# Patient Record
Sex: FEMALE | NOT HISPANIC OR LATINO | Employment: FULL TIME | URBAN - METROPOLITAN AREA
[De-identification: names, ages, dates, MRNs, and addresses within clinical notes are randomized per-mention and may not be internally consistent; named-entity substitution may affect disease eponyms.]

---

## 2024-08-07 PROBLEM — N83.209 OVARIAN CYST: Status: RESOLVED | Noted: 2021-08-30 | Resolved: 2024-08-07

## 2024-08-07 PROBLEM — Z15.89 MONOALLELIC MUTATION OF CHEK2 GENE IN FEMALE PATIENT: Status: ACTIVE | Noted: 2024-08-07

## 2024-08-07 PROBLEM — U07.1 COVID-19 VIRUS INFECTION: Status: RESOLVED | Noted: 2020-11-24 | Resolved: 2024-08-07

## 2024-08-07 PROBLEM — M71.20 BAKER'S CYST OF KNEE: Status: ACTIVE | Noted: 2020-08-27

## 2024-08-07 PROBLEM — N94.5 SECONDARY DYSMENORRHEA: Status: RESOLVED | Noted: 2018-07-03 | Resolved: 2024-08-07

## 2024-08-07 PROBLEM — R07.89 ATYPICAL CHEST PAIN: Status: RESOLVED | Noted: 2018-07-03 | Resolved: 2024-08-07

## 2024-08-07 PROBLEM — Z15.01 MONOALLELIC MUTATION OF CHEK2 GENE IN FEMALE PATIENT: Status: ACTIVE | Noted: 2024-08-07

## 2024-08-07 PROBLEM — R51.9 FREQUENT HEADACHES: Status: RESOLVED | Noted: 2018-07-03 | Resolved: 2024-08-07

## 2024-08-07 PROBLEM — Z15.09 MONOALLELIC MUTATION OF CHEK2 GENE IN FEMALE PATIENT: Status: ACTIVE | Noted: 2024-08-07

## 2024-08-07 PROBLEM — Z15.02 MONOALLELIC MUTATION OF CHEK2 GENE IN FEMALE PATIENT: Status: ACTIVE | Noted: 2024-08-07

## 2024-08-07 PROBLEM — D25.1 INTRAMURAL LEIOMYOMA OF UTERUS: Status: RESOLVED | Noted: 2018-08-10 | Resolved: 2024-08-07

## 2024-08-07 PROBLEM — E66.01 MORBID OBESITY (MULTI): Status: ACTIVE | Noted: 2018-07-03

## 2024-08-07 PROBLEM — G43.909 MIGRAINE: Status: ACTIVE | Noted: 2022-09-09

## 2024-08-07 PROBLEM — N93.8 DYSFUNCTIONAL UTERINE BLEEDING: Status: RESOLVED | Noted: 2020-07-17 | Resolved: 2024-08-07

## 2024-08-07 PROBLEM — M25.562 LEFT KNEE PAIN: Status: RESOLVED | Noted: 2020-08-27 | Resolved: 2024-08-07

## 2024-08-07 PROBLEM — K43.2 INCISIONAL HERNIA: Status: RESOLVED | Noted: 2021-08-30 | Resolved: 2024-08-07

## 2024-08-08 ENCOUNTER — APPOINTMENT (OUTPATIENT)
Dept: PRIMARY CARE | Facility: CLINIC | Age: 49
End: 2024-08-08
Payer: COMMERCIAL

## 2024-08-08 VITALS
SYSTOLIC BLOOD PRESSURE: 116 MMHG | DIASTOLIC BLOOD PRESSURE: 78 MMHG | OXYGEN SATURATION: 98 % | RESPIRATION RATE: 16 BRPM | BODY MASS INDEX: 35.24 KG/M2 | HEART RATE: 79 BPM | WEIGHT: 225 LBS

## 2024-08-08 DIAGNOSIS — N94.6 DYSMENORRHEA: ICD-10-CM

## 2024-08-08 DIAGNOSIS — Z12.31 ENCOUNTER FOR SCREENING MAMMOGRAM FOR MALIGNANT NEOPLASM OF BREAST: ICD-10-CM

## 2024-08-08 DIAGNOSIS — G43.009 MIGRAINE WITHOUT AURA AND WITHOUT STATUS MIGRAINOSUS, NOT INTRACTABLE: ICD-10-CM

## 2024-08-08 DIAGNOSIS — R10.32 LEFT LOWER QUADRANT PAIN: Primary | ICD-10-CM

## 2024-08-08 PROCEDURE — 1036F TOBACCO NON-USER: CPT | Performed by: PHYSICIAN ASSISTANT

## 2024-08-08 PROCEDURE — 99214 OFFICE O/P EST MOD 30 MIN: CPT | Performed by: PHYSICIAN ASSISTANT

## 2024-08-08 RX ORDER — RIZATRIPTAN BENZOATE 10 MG/1
10 TABLET, ORALLY DISINTEGRATING ORAL ONCE AS NEEDED
Qty: 9 TABLET | Refills: 0 | Status: SHIPPED | OUTPATIENT
Start: 2024-08-08 | End: 2024-09-07

## 2024-08-08 ASSESSMENT — ENCOUNTER SYMPTOMS
BLOOD IN STOOL: 0
CONSTIPATION: 0
DIARRHEA: 0
LOSS OF SENSATION IN FEET: 0
NAUSEA: 0
DEPRESSION: 0
VOMITING: 0
OCCASIONAL FEELINGS OF UNSTEADINESS: 0
RECTAL PAIN: 0
ABDOMINAL PAIN: 1

## 2024-08-08 ASSESSMENT — PAIN SCALES - GENERAL: PAINLEVEL: 3

## 2024-08-08 ASSESSMENT — PATIENT HEALTH QUESTIONNAIRE - PHQ9
1. LITTLE INTEREST OR PLEASURE IN DOING THINGS: NOT AT ALL
2. FEELING DOWN, DEPRESSED OR HOPELESS: NOT AT ALL
SUM OF ALL RESPONSES TO PHQ9 QUESTIONS 1 AND 2: 0

## 2024-08-08 NOTE — PROGRESS NOTES
Subjective   Patient ID: Lizzeth Hall is a 49 y.o. female who presents for Mass (Patient is here for a lump on her lower abd near the groin area, patient was seen before for it and feels it is getting bigger).    Abdominal Pain  This is a chronic problem. The current episode started more than 1 year ago. Pertinent negatives include no constipation, diarrhea, nausea or vomiting.    Had left ovary swelling on her left US in 2018. Never had her follow up US.    Review of Systems   Gastrointestinal:  Positive for abdominal pain. Negative for blood in stool, constipation, diarrhea, nausea, rectal pain and vomiting.   Genitourinary:  Positive for pelvic pain. Negative for urgency, vaginal bleeding, vaginal discharge and vaginal pain.       Objective   /78 (BP Location: Left arm, Patient Position: Sitting, BP Cuff Size: Large adult)   Pulse 79   Resp 16   Wt 102 kg (225 lb)   LMP 07/29/2024   SpO2 98%   BMI 35.24 kg/m²     Physical Exam  Cardiovascular:      Rate and Rhythm: Normal rate and regular rhythm.   Pulmonary:      Effort: Pulmonary effort is normal.   Abdominal:      Tenderness: There is abdominal tenderness. There is no guarding or rebound.      Comments: May have a hernia in the left lower quadrant when standing.   Neurological:      Mental Status: She is alert.         Assessment/Plan   Diagnoses and all orders for this visit:  Left lower quadrant pain  -     CT abdomen pelvis w and wo IV contrast; Future  -     CBC and Auto Differential; Future  -     Comprehensive Metabolic Panel; Future  -     Urinalysis with Reflex Microscopic; Future  Dysmenorrhea  -     CT abdomen pelvis w and wo IV contrast; Future  -     CBC and Auto Differential; Future  -     Comprehensive Metabolic Panel; Future  -     Urinalysis with Reflex Microscopic; Future  Encounter for screening mammogram for malignant neoplasm of breast  -     BI mammo bilateral screening tomosynthesis; Future  Migraine without aura and without  status migrainosus, not intractable  -     rizatriptan MLT (Maxalt-MLT) 10 mg disintegrating tablet; Take 1 tablet (10 mg) by mouth 1 time if needed for migraine. May repeat in 2 hours if unresolved. Do not exceed 30 mg in 24 hours.  Will obtain a CT due to persistency of symptoms, and worsening pain.She states the pelvic ultrasound was very painful and declines having another ultrasound.  Follow-up in a few weeks after her CT.

## 2024-08-10 ENCOUNTER — LAB (OUTPATIENT)
Dept: LAB | Facility: LAB | Age: 49
End: 2024-08-10
Payer: COMMERCIAL

## 2024-08-10 DIAGNOSIS — R10.32 LEFT LOWER QUADRANT PAIN: ICD-10-CM

## 2024-08-10 DIAGNOSIS — N94.6 DYSMENORRHEA: ICD-10-CM

## 2024-08-10 LAB
ALBUMIN SERPL-MCNC: 4.1 G/DL (ref 3.5–5)
ALP BLD-CCNC: 35 U/L (ref 35–125)
ALT SERPL-CCNC: 10 U/L (ref 5–40)
ANION GAP SERPL CALC-SCNC: 10 MMOL/L
APPEARANCE UR: CLEAR
AST SERPL-CCNC: 13 U/L (ref 5–40)
BASOPHILS # BLD AUTO: 0.1 X10*3/UL (ref 0–0.1)
BASOPHILS NFR BLD AUTO: 1 %
BILIRUB SERPL-MCNC: 0.4 MG/DL (ref 0.1–1.2)
BILIRUB UR STRIP.AUTO-MCNC: NEGATIVE MG/DL
BUN SERPL-MCNC: 15 MG/DL (ref 8–25)
CALCIUM SERPL-MCNC: 9.3 MG/DL (ref 8.5–10.4)
CHLORIDE SERPL-SCNC: 104 MMOL/L (ref 97–107)
CO2 SERPL-SCNC: 26 MMOL/L (ref 24–31)
COLOR UR: NORMAL
CREAT SERPL-MCNC: 0.9 MG/DL (ref 0.4–1.6)
EGFRCR SERPLBLD CKD-EPI 2021: 78 ML/MIN/1.73M*2
EOSINOPHIL # BLD AUTO: 0 X10*3/UL (ref 0–0.7)
EOSINOPHIL NFR BLD AUTO: 0 %
ERYTHROCYTE [DISTWIDTH] IN BLOOD BY AUTOMATED COUNT: 13.7 % (ref 11.5–14.5)
GLUCOSE SERPL-MCNC: 86 MG/DL (ref 65–99)
GLUCOSE UR STRIP.AUTO-MCNC: NORMAL MG/DL
HCT VFR BLD AUTO: 41.9 % (ref 36–46)
HGB BLD-MCNC: 13.4 G/DL (ref 12–16)
IMM GRANULOCYTES # BLD AUTO: 0.02 X10*3/UL (ref 0–0.7)
IMM GRANULOCYTES NFR BLD AUTO: 0.2 % (ref 0–0.9)
KETONES UR STRIP.AUTO-MCNC: NEGATIVE MG/DL
LEUKOCYTE ESTERASE UR QL STRIP.AUTO: NEGATIVE
LYMPHOCYTES # BLD AUTO: 3.44 X10*3/UL (ref 1.2–4.8)
LYMPHOCYTES NFR BLD AUTO: 35.8 %
MCH RBC QN AUTO: 28.8 PG (ref 26–34)
MCHC RBC AUTO-ENTMCNC: 32 G/DL (ref 32–36)
MCV RBC AUTO: 90 FL (ref 80–100)
MONOCYTES # BLD AUTO: 0.9 X10*3/UL (ref 0.1–1)
MONOCYTES NFR BLD AUTO: 9.4 %
NEUTROPHILS # BLD AUTO: 5.16 X10*3/UL (ref 1.2–7.7)
NEUTROPHILS NFR BLD AUTO: 53.6 %
NITRITE UR QL STRIP.AUTO: NEGATIVE
NRBC BLD-RTO: 0 /100 WBCS (ref 0–0)
PH UR STRIP.AUTO: 7 [PH]
PLATELET # BLD AUTO: 207 X10*3/UL (ref 150–450)
POTASSIUM SERPL-SCNC: 5 MMOL/L (ref 3.4–5.1)
PROT SERPL-MCNC: 6 G/DL (ref 5.9–7.9)
PROT UR STRIP.AUTO-MCNC: NEGATIVE MG/DL
RBC # BLD AUTO: 4.65 X10*6/UL (ref 4–5.2)
RBC # UR STRIP.AUTO: NEGATIVE /UL
SODIUM SERPL-SCNC: 140 MMOL/L (ref 133–145)
SP GR UR STRIP.AUTO: 1.01
UROBILINOGEN UR STRIP.AUTO-MCNC: NORMAL MG/DL
WBC # BLD AUTO: 9.6 X10*3/UL (ref 4.4–11.3)

## 2024-08-10 PROCEDURE — 81003 URINALYSIS AUTO W/O SCOPE: CPT

## 2024-08-10 PROCEDURE — 80053 COMPREHEN METABOLIC PANEL: CPT

## 2024-08-10 PROCEDURE — 36415 COLL VENOUS BLD VENIPUNCTURE: CPT

## 2024-08-10 PROCEDURE — 85025 COMPLETE CBC W/AUTO DIFF WBC: CPT

## 2024-08-17 ENCOUNTER — HOSPITAL ENCOUNTER (OUTPATIENT)
Dept: RADIOLOGY | Facility: CLINIC | Age: 49
Discharge: HOME | End: 2024-08-17
Payer: COMMERCIAL

## 2024-08-17 VITALS — BODY MASS INDEX: 35.31 KG/M2 | WEIGHT: 225 LBS | HEIGHT: 67 IN

## 2024-08-17 DIAGNOSIS — Z12.31 ENCOUNTER FOR SCREENING MAMMOGRAM FOR MALIGNANT NEOPLASM OF BREAST: ICD-10-CM

## 2024-08-17 PROCEDURE — 77067 SCR MAMMO BI INCL CAD: CPT | Performed by: RADIOLOGY

## 2024-08-17 PROCEDURE — 77063 BREAST TOMOSYNTHESIS BI: CPT | Performed by: RADIOLOGY

## 2024-08-17 PROCEDURE — 77067 SCR MAMMO BI INCL CAD: CPT

## 2024-08-23 ENCOUNTER — HOSPITAL ENCOUNTER (OUTPATIENT)
Dept: RADIOLOGY | Facility: CLINIC | Age: 49
End: 2024-08-23
Payer: COMMERCIAL

## 2024-08-30 DIAGNOSIS — G43.009 MIGRAINE WITHOUT AURA AND WITHOUT STATUS MIGRAINOSUS, NOT INTRACTABLE: ICD-10-CM

## 2024-08-30 RX ORDER — RIZATRIPTAN BENZOATE 10 MG/1
10 TABLET, ORALLY DISINTEGRATING ORAL ONCE AS NEEDED
Qty: 9 TABLET | Refills: 0 | Status: SHIPPED | OUTPATIENT
Start: 2024-08-30 | End: 2024-09-29

## 2024-09-03 ENCOUNTER — HOSPITAL ENCOUNTER (OUTPATIENT)
Dept: RADIOLOGY | Facility: CLINIC | Age: 49
Discharge: HOME | End: 2024-09-03
Payer: COMMERCIAL

## 2024-09-03 DIAGNOSIS — R10.32 LEFT LOWER QUADRANT PAIN: ICD-10-CM

## 2024-09-03 DIAGNOSIS — N94.6 DYSMENORRHEA: ICD-10-CM

## 2024-09-03 PROCEDURE — 74177 CT ABD & PELVIS W/CONTRAST: CPT | Performed by: STUDENT IN AN ORGANIZED HEALTH CARE EDUCATION/TRAINING PROGRAM

## 2024-09-03 PROCEDURE — 2550000001 HC RX 255 CONTRASTS: Performed by: PHYSICIAN ASSISTANT

## 2024-09-03 PROCEDURE — 74177 CT ABD & PELVIS W/CONTRAST: CPT

## 2024-09-05 ENCOUNTER — PATIENT MESSAGE (OUTPATIENT)
Dept: PRIMARY CARE | Facility: CLINIC | Age: 49
End: 2024-09-05
Payer: COMMERCIAL

## 2024-09-05 DIAGNOSIS — N85.8 UTERINE MASS: ICD-10-CM

## 2024-09-23 ENCOUNTER — OFFICE VISIT (OUTPATIENT)
Dept: OBSTETRICS AND GYNECOLOGY | Facility: CLINIC | Age: 49
End: 2024-09-23
Payer: COMMERCIAL

## 2024-09-23 VITALS — SYSTOLIC BLOOD PRESSURE: 128 MMHG | BODY MASS INDEX: 34.36 KG/M2 | DIASTOLIC BLOOD PRESSURE: 78 MMHG | WEIGHT: 219.4 LBS

## 2024-09-23 DIAGNOSIS — D25.1 INTRAMURAL UTERINE FIBROID: ICD-10-CM

## 2024-09-23 DIAGNOSIS — Z12.4 SCREENING FOR CERVICAL CANCER: ICD-10-CM

## 2024-09-23 DIAGNOSIS — R10.2 PELVIC PAIN: Primary | ICD-10-CM

## 2024-09-23 PROCEDURE — 99204 OFFICE O/P NEW MOD 45 MIN: CPT | Performed by: STUDENT IN AN ORGANIZED HEALTH CARE EDUCATION/TRAINING PROGRAM

## 2024-09-23 PROCEDURE — 1036F TOBACCO NON-USER: CPT | Performed by: STUDENT IN AN ORGANIZED HEALTH CARE EDUCATION/TRAINING PROGRAM

## 2024-09-23 PROCEDURE — 99214 OFFICE O/P EST MOD 30 MIN: CPT | Performed by: STUDENT IN AN ORGANIZED HEALTH CARE EDUCATION/TRAINING PROGRAM

## 2024-09-23 ASSESSMENT — PATIENT HEALTH QUESTIONNAIRE - PHQ9
2. FEELING DOWN, DEPRESSED OR HOPELESS: NOT AT ALL
1. LITTLE INTEREST OR PLEASURE IN DOING THINGS: NOT AT ALL
SUM OF ALL RESPONSES TO PHQ9 QUESTIONS 1 & 2: 0

## 2024-09-23 ASSESSMENT — LIFESTYLE VARIABLES
AUDIT-C TOTAL SCORE: 2
HOW OFTEN DO YOU HAVE SIX OR MORE DRINKS ON ONE OCCASION: LESS THAN MONTHLY
SKIP TO QUESTIONS 9-10: 0
HOW MANY STANDARD DRINKS CONTAINING ALCOHOL DO YOU HAVE ON A TYPICAL DAY: 1 OR 2
HOW OFTEN DO YOU HAVE A DRINK CONTAINING ALCOHOL: MONTHLY OR LESS

## 2024-09-23 ASSESSMENT — ENCOUNTER SYMPTOMS
LOSS OF SENSATION IN FEET: 0
DEPRESSION: 0
OCCASIONAL FEELINGS OF UNSTEADINESS: 0

## 2024-09-23 ASSESSMENT — PAIN SCALES - GENERAL: PAINLEVEL: 2

## 2024-09-23 NOTE — ASSESSMENT & PLAN NOTE
-Patient with uterine fibroids as detailed on CT imaging results above  -Declines medical management and desires hysterectomy as detailed above  -Will obtain abdominal ultrasound for further evaluation of her uterus

## 2024-09-23 NOTE — ASSESSMENT & PLAN NOTE
"-Patient complaining of pelvic pain for the past 5 years that has progressively worsened  -Recent CT A/P showing \"Heterogeneously enlarged uterus, with a large fibroid measuring up to  6.2 x 5.9 cm. 4.9 x 3.7 cm left ovarian cyst.\"  -Patient is interested in surgical management with a hysterectomy and is not interested in medical therapy and declined this   -Thoroughly discussed the risks, benefits, alternatives of surgical options and discussed different routes of hysterectomy including vaginal, abdominal, laparoscopic   -Also discussed ovarian preservation versus removal and that removing the ovaries could put her into surgical menopause and she voiced understanding  -We also discussed that would need additional imaging prior to making a decision regarding surgical management  -Patient is declining transvaginal ultrasound, however is agreeable to abdominal pelvic ultrasound  -Will follow-up with patient after abdominal ultrasound results return  "

## 2024-09-23 NOTE — PROGRESS NOTES
"Subjective     History Of Present Illness:    Dayan Hall \"Yung" is a 49 y.o.  presenting for complaint of chronic pelvic pain.    She describes the pelvic pain as being present for the past 5 years, pressure like sensation, intermittent initially but now constant, 2-5 in intensity, more pronounced in her LLQ with some radiation to her left back. Feels that this is significantly affecting her quality of life. Does not take medication for the pain.    Her cycles were regular every 28 days, but now more irregular as they occur every few months, they are associated with heavy bleeding/cramping/and blood clots. Not on contraception.     Menstrual History:  OB History          0    Para   0    Term   0       0    AB   0    Living   0         SAB   0    IAB   0    Ectopic   0    Multiple   0    Live Births   0               Patient's last menstrual period was 2024 (exact date).     Last Pap Smear:  Result Date Procedure Results Follow-ups Next Due   2019 CONVERTED GYN CYTOLOGY Negative for intraepithelial lesion or malignancy.   -HPV 16  NEGATIVE  -HPV 18 NEGATIVE  *Other HPV (High Risk)      NEGATIVE       Last Mammogram:   24  Impression  No mammographic evidence of malignancy.  BI-RADS Category I    Past Medical History:  Past Medical History:   Diagnosis Date    Atypical chest pain 2018    COVID-19 virus infection 2020    Dysfunctional uterine bleeding 2020    Intramural leiomyoma of uterus 08/10/2018    Left knee pain 2020    Migraine     Ovarian cyst 2021     Past Surgical History:  Past Surgical History:   Procedure Laterality Date    BELT ABDOMINOPLASTY      CHOLECYSTECTOMY         Social History:  Social History     Tobacco Use    Smoking status: Never     Passive exposure: Never    Smokeless tobacco: Never    Tobacco comments:     Eww   Substance Use Topics    Alcohol use: Yes     Comment: Maybe 3 cider beers a year.     Family " "History:  Family History   Problem Relation Name Age of Onset    Breast cancer Mother Cecelia henning 65 - 70    Stroke Father      Other (pig valve) Father        Allergies:  Patient has no known allergies.    Outpatient Medications:  Current Outpatient Medications   Medication Instructions    rizatriptan MLT (MAXALT-MLT) 10 mg, oral, Once as needed, May repeat in 2 hours if unresolved. Do not exceed 30 mg in 24 hours.     Review of Systems:  Denies nausea, vomiting, urinary frequency, dysuria, hematuria, constipation, hematochezia, abnormal bleeding, abnormal vaginal discharge, or dyspareunia.    Objective   Last Recorded Vitals:  Visit Vitals  /78     Physical Exam:  General: Alert and oriented, in no acute distress.  Psych: Normal affect and mood. Able to answer questions appropriately.   Heart: Regular rate.  Lungs: Non labored respirations.  Speculum:  Vulva: Normal architecture without erythema, masses, or lesions.   Vagina: Normal moist mucosa without lesions, masses, or atrophy. No abnormal vaginal discharge.   Cervix: Normal without erythema, masses, lesions, or signs of cervicitis.  Bimanual:   Cervix: No cervical motion tenderness.  Uterus: Normal, mobile uterus with discomfort on exam  Adnexa: + Discomfort on exam, no palpable nodularity, masses, or lesions.    Imaging:  CT A/P 8/8/24  REPRODUCTIVE ORGANS:  Heterogeneously enlarged uterus, with a large fibroid measuring up to  6.2 x 5.9 cm. 4.9 x 3.7 cm left ovarian cyst.        IMPRESSION:  1.  No acute findings in the abdomen and pelvis.  2. Large uterine mass, likely a fibroid, measuring 6.2 x 5.9 cm. This  may be further evaluated with pelvic ultrasound.    Assessment/Plan   Problem List Items Addressed This Visit       Pelvic pain - Primary     -Patient complaining of pelvic pain for the past 5 years that has progressively worsened  -Recent CT A/P showing \"Heterogeneously enlarged uterus, with a large fibroid measuring up to  6.2 x 5.9 cm. 4.9 x " "3.7 cm left ovarian cyst.\"  -Patient is interested in surgical management with a hysterectomy and is not interested in medical therapy and declined this   -Thoroughly discussed the risks, benefits, alternatives of surgical options and discussed different routes of hysterectomy including vaginal, abdominal, laparoscopic   -Also discussed ovarian preservation versus removal and that removing the ovaries could put her into surgical menopause and she voiced understanding  -We also discussed that would need additional imaging prior to making a decision regarding surgical management  -Patient is declining transvaginal ultrasound, however is agreeable to abdominal pelvic ultrasound  -Will follow-up with patient after abdominal ultrasound results return         Relevant Orders    US PELVIS TRANSABDOMINAL WITH TRANSVAGINAL    Intramural uterine fibroid     -Patient with uterine fibroids as detailed on CT imaging results above  -Declines medical management and desires hysterectomy as detailed above  -Will obtain abdominal ultrasound for further evaluation of her uterus         Screening for cervical cancer     -Pap smear collected today         Relevant Orders    THINPREP PAP       Farhana Sparks MD  "

## 2024-09-25 ENCOUNTER — HOSPITAL ENCOUNTER (OUTPATIENT)
Dept: RADIOLOGY | Facility: CLINIC | Age: 49
Discharge: HOME | End: 2024-09-25
Payer: COMMERCIAL

## 2024-09-25 DIAGNOSIS — R10.2 PELVIC PAIN: ICD-10-CM

## 2024-09-25 PROCEDURE — 76856 US EXAM PELVIC COMPLETE: CPT

## 2024-09-25 PROCEDURE — 76856 US EXAM PELVIC COMPLETE: CPT | Performed by: RADIOLOGY

## 2024-10-04 LAB
CYTOLOGY CMNT CVX/VAG CYTO-IMP: NORMAL
HPV HR 12 DNA GENITAL QL NAA+PROBE: NEGATIVE
HPV HR GENOTYPES PNL CVX NAA+PROBE: NEGATIVE
HPV16 DNA SPEC QL NAA+PROBE: NEGATIVE
HPV18 DNA SPEC QL NAA+PROBE: NEGATIVE
LAB AP HPV GENOTYPE QUESTION: YES
LAB AP HPV HR: NORMAL
LABORATORY COMMENT REPORT: NORMAL
LABORATORY COMMENT REPORT: NORMAL
LMP START DATE: NORMAL
PATH REPORT.TOTAL CANCER: NORMAL